# Patient Record
Sex: FEMALE | Race: WHITE | NOT HISPANIC OR LATINO | URBAN - METROPOLITAN AREA
[De-identification: names, ages, dates, MRNs, and addresses within clinical notes are randomized per-mention and may not be internally consistent; named-entity substitution may affect disease eponyms.]

---

## 2017-01-31 ENCOUNTER — OFFICE (OUTPATIENT)
Dept: URBAN - METROPOLITAN AREA CLINIC 11 | Facility: CLINIC | Age: 32
End: 2017-01-31

## 2017-01-31 VITALS
HEIGHT: 62 IN | WEIGHT: 153 LBS | SYSTOLIC BLOOD PRESSURE: 120 MMHG | DIASTOLIC BLOOD PRESSURE: 75 MMHG | HEART RATE: 86 BPM

## 2017-01-31 DIAGNOSIS — Z33.1 PATIENT CURRENTLY PREGNANT: ICD-10-CM

## 2017-01-31 DIAGNOSIS — K50.00 CROHN'S DISEASE OF ILEUM: ICD-10-CM

## 2017-01-31 DIAGNOSIS — E55.9 VITAMIN D DEFICIENCY: ICD-10-CM

## 2017-01-31 PROCEDURE — 99213 OFFICE O/P EST LOW 20 MIN: CPT

## 2017-01-31 NOTE — SERVICEHPINOTES
PILI MONTANO   returns today for follow-up from last visit on   7/20/2016  .    Patient doing well overall. Is currently pregnant at 26 weeks with a baby girl.BRHaving one BM every day. No blood in the stools. Stools are formed. No abdominal pain, has some occasional lower cramping, but not severe. Still on Humira.

## 2017-03-27 ENCOUNTER — OFFICE (OUTPATIENT)
Dept: URBAN - METROPOLITAN AREA CLINIC 11 | Facility: CLINIC | Age: 32
End: 2017-03-27

## 2017-03-27 VITALS
HEIGHT: 62 IN | SYSTOLIC BLOOD PRESSURE: 115 MMHG | HEART RATE: 92 BPM | WEIGHT: 164 LBS | DIASTOLIC BLOOD PRESSURE: 73 MMHG

## 2017-03-27 DIAGNOSIS — K50.00 CROHN'S DISEASE OF ILEUM: ICD-10-CM

## 2017-03-27 DIAGNOSIS — Z33.1 PATIENT CURRENTLY PREGNANT: ICD-10-CM

## 2017-03-27 PROCEDURE — 99213 OFFICE O/P EST LOW 20 MIN: CPT

## 2017-03-27 NOTE — SERVICEHPINOTES
PILI MONTANO   returns today for follow-up from last visit on   2017  .    Patient currently 34 weeks pregnant. Has a scheduled  on  (39 weeks). Has about 1 BM every other day, formed. No blood. Had one episode of diarrhea last week which may be related to something she ate ([employer]cheese). Otherwise is feeling well and pregnancy is going great. Has a dose of Humira planned for today. She was seen in multidisciplinary clinic at [facility] and plan were to administer last dose of Humira around week 35-36 and then wait until after delivery as long as symptoms are going well.

## 2017-06-05 ENCOUNTER — OFFICE (OUTPATIENT)
Dept: URBAN - METROPOLITAN AREA CLINIC 11 | Facility: CLINIC | Age: 32
End: 2017-06-05

## 2017-06-05 VITALS — DIASTOLIC BLOOD PRESSURE: 59 MMHG | WEIGHT: 145 LBS | SYSTOLIC BLOOD PRESSURE: 119 MMHG | HEIGHT: 62 IN

## 2017-06-05 DIAGNOSIS — K50.00 CROHN'S DISEASE OF ILEUM: ICD-10-CM

## 2017-06-05 PROCEDURE — 99213 OFFICE O/P EST LOW 20 MIN: CPT

## 2017-06-05 RX ORDER — ADALIMUMAB 40MG/0.8ML
KIT SUBCUTANEOUS
Qty: 2 | Status: ACTIVE

## 2017-06-05 NOTE — SERVICEHPINOTES
PILI MONTANO   returns today for follow-up from last visit on   3/27/2017  .   Patient delivered health baby Mehreen on  via . Started humira 2 days after the . Having a BM every other day, formed, no blood. Has occasional cramps when anxious. No pain with bowel movement, no pain around perianal area.BRCurrently breast feeding. Shiras had post-partum depression and is working with a clinic at the hospital for this. Feeling better since starting Celexa.

## 2017-10-09 ENCOUNTER — OFFICE (OUTPATIENT)
Dept: URBAN - METROPOLITAN AREA CLINIC 11 | Facility: CLINIC | Age: 32
End: 2017-10-09

## 2017-10-09 VITALS
DIASTOLIC BLOOD PRESSURE: 77 MMHG | HEIGHT: 62 IN | SYSTOLIC BLOOD PRESSURE: 106 MMHG | WEIGHT: 147 LBS | HEART RATE: 76 BPM

## 2017-10-09 DIAGNOSIS — E55.9 VITAMIN D DEFICIENCY: ICD-10-CM

## 2017-10-09 DIAGNOSIS — K50.00 CROHN'S DISEASE OF ILEUM: ICD-10-CM

## 2017-10-09 LAB
ALBUMIN SERPL-MCNC: 4.3 GM/DL (ref 3.2–4.8)
ALBUMIN/GLOB SERPL: 1.6 {RATIO} (ref 1.3–2.1)
ALP SERPL-CCNC: 61 INTLUNIT/L (ref 46–116)
ALT SERPL-CCNC: 33 INTLUNIT/L (ref 10–50)
AST SERPL-CCNC: 27 INTLUNIT/L (ref 10–40)
BILIRUB SERPL-MCNC: 0.3 MG/DL (ref 0–1)
BUN SERPL-MCNC: 14 MG/DL (ref 9–23)
CALCIUM SERPL-MCNC: 9.6 MG/DL (ref 8.4–10.2)
CHLORIDE SERPL-SCNC: 107 MMOL/L (ref 99–109)
CO2 SERPL-SCNC: 26.1 MMOL/L (ref 20–31)
COMPLETE BLOOD COUNT: PERFORMING LAB: (no result)
COMPLETE BLOOD COUNT: RDW-SD: 43.8 FL (ref 39–56)
COMPREHENSIVE METABOLIC PANEL: ANION CALC NO K: 9
COMPREHENSIVE METABOLIC PANEL: GFR AFRIAMERICAN: >60
COMPREHENSIVE METABOLIC PANEL: GFR NON AFRIAMER: >60
COMPREHENSIVE METABOLIC PANEL: PERFORMING LAB: (no result)
CREAT SERPL-MCNC: 0.81 MG/DL (ref 0.5–1.2)
ERYTHROCYTE [DISTWIDTH] IN BLOOD: 13.5 % (ref 11.5–16.4)
GLOBULIN SER-MCNC: 2.7 GM/DL (ref 2.1–3.3)
GLUCOSE SERPL-MCNC: 100 MG/DL — HIGH (ref 70–99)
HCT VFR BLD AUTO: 38.2 % (ref 36–45)
HGB BLD-MCNC: 13.3 GM/DL (ref 12–15.5)
IRON SATN MFR SERPL: 42 % (ref 20–45)
IRON SERPL-MCNC: 167 MCG/DL — HIGH (ref 40–145)
Lab: (no result)
Lab: (no result)
MCH RBC QN AUTO: 30.6 PG (ref 27–33)
MCHC RBC AUTO-ENTMCNC: 34.8 GM/DL (ref 31–36)
MCV RBC AUTO: 87.8 FL (ref 81–99)
PLATELET # BLD AUTO: 184 X10(3)/MCL (ref 150–400)
PMV BLD AUTO: 12 FL (ref 9.4–12)
POTASSIUM SERPL-SCNC: 4.5 MMOL/L (ref 3.5–5.5)
PROT SERPL-MCNC: 7 GM/DL (ref 5.7–8.2)
RBC # BLD AUTO: 4.35 X10(6)/MCL (ref 4.1–5.2)
SODIUM SERPL-SCNC: 142 MMOL/L (ref 135–146)
THYROTROPIN [MOLES/VOLUME] IN SERUM OR PLASMA: 1.04 MCIU/ML (ref 0.35–4.78)
TIBC SERPL-MCNC: 398 MCG/DL (ref 250–400)
WBC # BLD AUTO: 4 X10(3)/MCL — LOW (ref 4.4–10.8)

## 2017-10-09 PROCEDURE — 99213 OFFICE O/P EST LOW 20 MIN: CPT

## 2017-10-09 NOTE — SERVICEHPINOTES
PILI MONTANO   returns today for follow-up from last visit on   6/5/2017  .    Patient having 1BM daily. Had diarrhea a couple of times last week which resolved on its own. No abdominal pain. Noted some blood with wiping a few days ago, resolved on its own. BRCurrently on Humira every 10 days, tolerating well.  BRAnxiety and Depression are not great. Anxiety worse than the depression.BRHaving recurrence of occasional low grade fever again. This resolved during pregnancy however seems to be coming back.  FONT style="BACKGROUND-COLOR: #ffffcc" visited="true"BR/FONT

## 2018-04-09 ENCOUNTER — OFFICE (OUTPATIENT)
Dept: URBAN - METROPOLITAN AREA CLINIC 11 | Facility: CLINIC | Age: 33
End: 2018-04-09

## 2018-04-09 VITALS
SYSTOLIC BLOOD PRESSURE: 110 MMHG | HEIGHT: 62 IN | DIASTOLIC BLOOD PRESSURE: 82 MMHG | WEIGHT: 149 LBS | HEART RATE: 94 BPM

## 2018-04-09 DIAGNOSIS — K50.00 CROHN'S DISEASE OF ILEUM: ICD-10-CM

## 2018-04-09 DIAGNOSIS — E55.9 VITAMIN D DEFICIENCY: ICD-10-CM

## 2018-04-09 PROCEDURE — 99213 OFFICE O/P EST LOW 20 MIN: CPT

## 2018-04-09 NOTE — SERVICEHPINOTES
PILI MONTANO   returns today for follow-up from last visit on   10/9/2017  .    Patient doing well overall. Has 1BM daily, formed, no urgency, no blood. Has abdominal pain about once per month, mild. BRStill with some low grade temperature, about 99 deg. No night sweats. No unintentional weight loss. BRStarted Abilify for depression/anxiety which has been working well. BROn Humira every 10 days which is working well.  FONT style="BACKGROUND-COLOR: #ffffff" visited="true"BR/FONT

## 2018-11-05 ENCOUNTER — OFFICE (OUTPATIENT)
Dept: URBAN - METROPOLITAN AREA CLINIC 11 | Facility: CLINIC | Age: 33
End: 2018-11-05

## 2018-11-05 VITALS
HEIGHT: 62 IN | DIASTOLIC BLOOD PRESSURE: 64 MMHG | WEIGHT: 149 LBS | SYSTOLIC BLOOD PRESSURE: 118 MMHG | HEART RATE: 62 BPM

## 2018-11-05 DIAGNOSIS — E55.9 VITAMIN D DEFICIENCY: ICD-10-CM

## 2018-11-05 DIAGNOSIS — K50.00 CROHN'S DISEASE OF ILEUM: ICD-10-CM

## 2018-11-05 PROCEDURE — 99213 OFFICE O/P EST LOW 20 MIN: CPT

## 2018-11-05 NOTE — SERVICEHPINOTES
PILI MONTANO   returns today for follow-up from last visit on   4/9/2018  .    Doing well. using Humira every 10-11 days. Having 1 BM daily, formed, no blood. No urgency. Has some bloating abdominal pain about once per month, same as last time.Stopped Abilify.

## 2019-07-26 ENCOUNTER — OFFICE (OUTPATIENT)
Dept: URBAN - METROPOLITAN AREA CLINIC 11 | Facility: CLINIC | Age: 34
End: 2019-07-26

## 2019-07-26 VITALS
WEIGHT: 147 LBS | HEART RATE: 87 BPM | HEIGHT: 62 IN | SYSTOLIC BLOOD PRESSURE: 132 MMHG | DIASTOLIC BLOOD PRESSURE: 75 MMHG

## 2019-07-26 DIAGNOSIS — K50.00 CROHN'S DISEASE OF ILEUM: ICD-10-CM

## 2019-07-26 DIAGNOSIS — Z33.1 PATIENT CURRENTLY PREGNANT: ICD-10-CM

## 2019-07-26 DIAGNOSIS — E55.9 VITAMIN D DEFICIENCY: ICD-10-CM

## 2019-07-26 PROCEDURE — 99213 OFFICE O/P EST LOW 20 MIN: CPT

## 2019-07-26 RX ORDER — HYOSCYAMINE SULFATE 0.12 MG/1
TABLET ORAL; SUBLINGUAL
Qty: 60 | Status: ACTIVE

## 2019-07-26 NOTE — SERVICEHPINOTES
PILI MONTANO   returns today for follow-up from last visit on   11/5/2018  .    Patient overall doing well. Has about 1 BM daily, formed. No blood in the stools. No abdominal pain. Using Humira every 10-11 days, tolerating well. No pain at the injection site. Has an occasional abdominal bloating/cramping which lasts about half a day. This resolves on its own. There is some relation to mood/anxiety.Is planning on second pregnancy.

## 2020-01-17 ENCOUNTER — OFFICE (OUTPATIENT)
Dept: URBAN - METROPOLITAN AREA CLINIC 11 | Facility: CLINIC | Age: 35
End: 2020-01-17

## 2020-01-17 VITALS
WEIGHT: 139 LBS | DIASTOLIC BLOOD PRESSURE: 67 MMHG | SYSTOLIC BLOOD PRESSURE: 113 MMHG | HEART RATE: 80 BPM | HEIGHT: 62 IN

## 2020-01-17 DIAGNOSIS — K50.00 CROHN'S DISEASE OF ILEUM: ICD-10-CM

## 2020-01-17 PROCEDURE — 99213 OFFICE O/P EST LOW 20 MIN: CPT

## 2020-01-17 NOTE — SERVICEHPINOTES
PILI MONTANO   returns today for follow-up from last visit on   7/26/2019  .    Patient doing well. Symptoms are controlled. Continues to have 1 BM daily, formed, no blood, no abdominal pain. Humira tolerated well every 10-11 days. BR   BRPlanning on second pregnancy.BR

## 2020-04-17 ENCOUNTER — Encounter (OUTPATIENT)
Dept: URBAN - METROPOLITAN AREA CLINIC 28 | Facility: CLINIC | Age: 35
End: 2020-04-17

## 2020-04-17 VITALS — HEIGHT: 62 IN

## 2020-04-17 DIAGNOSIS — Z33.1 PREGNANT: ICD-10-CM

## 2020-04-17 DIAGNOSIS — K50.00 CROHN'S DISEASE OF ILEUM: ICD-10-CM

## 2020-04-17 PROCEDURE — G0406 INPT/TELE FOLLOW UP 15: HCPCS | Performed by: INTERNAL MEDICINE

## 2020-04-17 PROCEDURE — 99213 OFFICE O/P EST LOW 20 MIN: CPT | Performed by: INTERNAL MEDICINE

## 2020-04-17 NOTE — SERVICEHPINOTES
Patient presents today follow-up via virtual visit (via [email or web address]). Patient doing well overall. Having 1BM daily, no abdominal pain. No diarrhea. No blood in the stools. Tolerating Humira well.Currently 15 weeks pregnant, going well, having a baby girl.Currently not working given COVID restrictions.   BR

## 2020-06-22 ENCOUNTER — Encounter (OUTPATIENT)
Dept: URBAN - METROPOLITAN AREA CLINIC 28 | Facility: CLINIC | Age: 35
End: 2020-06-22

## 2020-06-22 DIAGNOSIS — K50.00 CROHN'S DISEASE OF ILEUM: ICD-10-CM

## 2020-06-22 DIAGNOSIS — Z33.1 PREGNANT: ICD-10-CM

## 2020-06-22 PROCEDURE — 99213 OFFICE O/P EST LOW 20 MIN: CPT | Performed by: INTERNAL MEDICINE

## 2020-06-22 NOTE — SERVICEHPINOTES
PILI MONTANO   returns today for follow-up from last visit on   4/17/2020  .    Doing well overall. Had a hand burn with oil last week, healing on its own. GI symptoms under control. No diarrhea. Ha 1BM daily, no blood. Tolerating Humira well. Pregnancy going well.

## 2020-06-26 VITALS — HEIGHT: 62 IN

## 2020-11-05 ENCOUNTER — Encounter (OUTPATIENT)
Dept: URBAN - METROPOLITAN AREA CLINIC 28 | Facility: CLINIC | Age: 35
End: 2020-11-05

## 2020-11-05 VITALS — HEIGHT: 62 IN

## 2020-11-05 DIAGNOSIS — Z33.1 PREGNANT: ICD-10-CM

## 2020-11-05 DIAGNOSIS — K50.00 CROHN'S DISEASE OF ILEUM: ICD-10-CM

## 2020-11-05 PROCEDURE — G0406 INPT/TELE FOLLOW UP 15: HCPCS | Performed by: INTERNAL MEDICINE

## 2020-11-05 PROCEDURE — 99213 OFFICE O/P EST LOW 20 MIN: CPT | Performed by: INTERNAL MEDICINE

## 2020-11-05 NOTE — SERVICEHPINOTES
PILI      DUSTY   presents for a follow-up   visit via telehealth.  Patient was last seen on   2020  . Patient underwent  on 2020, baby girl Judy Crum (Healthy, 6lbs 5oz). Has some pain at the incision site. Takes oxycodone for the pain on occasion. This has effected bowel habits, more constipation and bloating. Taking benefiber with some relief. Miralax led to cramps. Restarted Humira on Oct 2nd. No GI symptoms aside from the constipation on occasion. Mood is down a bit. Restarted Celexa and Wellbutrin.  BR

## 2021-02-04 ENCOUNTER — Encounter (OUTPATIENT)
Dept: URBAN - METROPOLITAN AREA CLINIC 28 | Facility: CLINIC | Age: 36
End: 2021-02-04

## 2021-02-04 VITALS — HEIGHT: 62 IN

## 2021-02-04 DIAGNOSIS — K50.00 CROHN'S DISEASE OF ILEUM: ICD-10-CM

## 2021-02-04 PROCEDURE — G0406 INPT/TELE FOLLOW UP 15: HCPCS | Performed by: INTERNAL MEDICINE

## 2021-02-04 PROCEDURE — 99213 OFFICE O/P EST LOW 20 MIN: CPT | Performed by: INTERNAL MEDICINE

## 2021-02-04 NOTE — SERVICEHPINOTES
PILI      DUSTY   presents for a follow-up   visit via telehealth.  Patient was last seen on   11/5/2020  . GI symptoms are going well. Back to normal. Humira every 10 days. Has pain with injection.Received first dose of Moderna COVID vaccine on Jan 14th. No issues with Crohns subsequent to this. Judy (4months) and Ekta are doing well. Experiencing post partum depression, working with someone for this. BR

## 2021-06-14 ENCOUNTER — Encounter (OUTPATIENT)
Dept: URBAN - METROPOLITAN AREA CLINIC 28 | Facility: CLINIC | Age: 36
End: 2021-06-14

## 2021-06-14 VITALS — TEMPERATURE: 96.9 F | HEIGHT: 62 IN

## 2021-06-14 DIAGNOSIS — K50.00 CROHN'S DISEASE OF ILEUM: ICD-10-CM

## 2021-06-14 PROCEDURE — 99214 OFFICE O/P EST MOD 30 MIN: CPT | Performed by: INTERNAL MEDICINE

## 2021-06-14 NOTE — SERVICEHPINOTES
PILI MONTANO   returns today for follow-up from last visit on   2/4/2021  .    Patient overall doing ok. HAs some lower abdominal cramps 1-2 times a month. Stools are formed. No blood in the stools.Mood is overall down, doing better than prior. Back to work.

## 2021-08-26 LAB
MRI PELVIS W/O  AND  W/DYE: (no result)
MRI PELVIS W/O  AND  W/DYE: NOTE: (no result)

## 2022-01-24 ENCOUNTER — Encounter (OUTPATIENT)
Dept: URBAN - METROPOLITAN AREA CLINIC 28 | Facility: CLINIC | Age: 37
End: 2022-01-24

## 2022-01-24 VITALS — TEMPERATURE: 97.5 F | HEIGHT: 62 IN

## 2022-01-24 DIAGNOSIS — K50.00 CROHN'S DISEASE OF ILEUM: ICD-10-CM

## 2022-01-24 PROCEDURE — 99214 OFFICE O/P EST MOD 30 MIN: CPT | Performed by: INTERNAL MEDICINE

## 2022-01-24 NOTE — SERVICEHPINOTES
PILI MONTANO   returns today for follow-up from last visit on   6/14/2021  . Migraines are recurring. Pending Botox this week. 
br
brGI symptoms are under control. Humira every 10 days. No diarrhea. No abdominal pain. Doing well overall.

## 2022-07-11 ENCOUNTER — Encounter (OUTPATIENT)
Dept: URBAN - METROPOLITAN AREA CLINIC 28 | Facility: CLINIC | Age: 37
End: 2022-07-11

## 2022-07-11 VITALS — TEMPERATURE: 97.3 F | HEIGHT: 62 IN

## 2022-07-11 DIAGNOSIS — K50.00 CROHN'S DISEASE OF ILEUM: ICD-10-CM

## 2022-07-11 DIAGNOSIS — G43.909 MIGRAINE: ICD-10-CM

## 2022-07-11 PROCEDURE — 99213 OFFICE O/P EST LOW 20 MIN: CPT | Performed by: INTERNAL MEDICINE

## 2022-07-11 NOTE — SERVICEHPINOTES
PILI MONTANO   returns today for follow-up from last visit on   1/24/2022  .   Patient doing well in terms of bowel habits. No diarrhea. Stools are formed. No abdominal pain. Humira tolerating every 10 days.
br
brContinues to have migraines and neck pain. Pending MRI of neck today.

## 2023-01-23 ENCOUNTER — Encounter (OUTPATIENT)
Dept: URBAN - METROPOLITAN AREA CLINIC 28 | Facility: CLINIC | Age: 38
End: 2023-01-23

## 2023-01-23 VITALS — HEIGHT: 62 IN | TEMPERATURE: 97.7 F

## 2023-01-23 DIAGNOSIS — G43.909 MIGRAINE: ICD-10-CM

## 2023-01-23 DIAGNOSIS — K50.00 CROHN'S DISEASE OF ILEUM: ICD-10-CM

## 2023-01-23 PROCEDURE — 99213 OFFICE O/P EST LOW 20 MIN: CPT | Performed by: INTERNAL MEDICINE

## 2023-01-23 NOTE — SERVICEHPINOTES
PILI MONTANO   returns today for follow-up from last visit on   7/11/2022  .   Patient dealing with sig neck pain since last visit. Seeing Rheumatology and Pain specialist. Had epidural.  
br
br Doing well in terms of Crohn's disease. Tolerating Humira every 10 days. Bowel movements are regular. No abdominal pain.

## 2023-07-17 ENCOUNTER — Encounter (OUTPATIENT)
Dept: URBAN - METROPOLITAN AREA CLINIC 28 | Facility: CLINIC | Age: 38
End: 2023-07-17

## 2023-07-17 VITALS — HEIGHT: 62 IN | TEMPERATURE: 98.2 F

## 2023-07-17 DIAGNOSIS — K50.00 CROHN'S DISEASE OF ILEUM: ICD-10-CM

## 2023-07-17 PROCEDURE — 99213 OFFICE O/P EST LOW 20 MIN: CPT | Performed by: INTERNAL MEDICINE

## 2024-06-28 ENCOUNTER — HOSPITAL ENCOUNTER (OUTPATIENT)
Dept: MAMMOGRAPHY | Facility: HOSPITAL | Age: 39
Discharge: HOME | End: 2024-06-28
Payer: COMMERCIAL

## 2024-06-28 DIAGNOSIS — N64.4 BREAST PAIN: ICD-10-CM

## 2024-06-28 PROCEDURE — 77066 DX MAMMO INCL CAD BI: CPT

## 2024-06-28 PROCEDURE — 76641 ULTRASOUND BREAST COMPLETE: CPT

## 2024-06-28 PROCEDURE — 77062 BREAST TOMOSYNTHESIS BI: CPT

## 2024-12-13 ENCOUNTER — Encounter (OUTPATIENT)
Dept: URBAN - METROPOLITAN AREA CLINIC 28 | Facility: CLINIC | Age: 39
End: 2024-12-13

## 2024-12-13 VITALS — HEIGHT: 62 IN

## 2024-12-13 DIAGNOSIS — K50.00 CROHN'S DISEASE OF ILEUM: ICD-10-CM

## 2024-12-13 PROCEDURE — G0406 INPT/TELE FOLLOW UP 15: HCPCS | Performed by: INTERNAL MEDICINE

## 2024-12-13 PROCEDURE — 99213 OFFICE O/P EST LOW 20 MIN: CPT | Performed by: INTERNAL MEDICINE

## 2024-12-13 RX ORDER — ADALIMUMAB-ADBM 40MG/0.4ML
KIT SUBCUTANEOUS
Qty: 2 | Status: ACTIVE
Start: 2024-12-13

## 2024-12-13 NOTE — SERVICEHPINOTES
PILI MONTANO   returns today for follow-up from last visit on   7/17/2023  . Patient overall ok. Has had some issues with bloating and gas. Has gained weight, about 20lbs while on Abilify. Has been changing psych meds every 2 months or so. 
br
br Has been doing well with Humira. Is currently out of meds and needs a refill.